# Patient Record
Sex: MALE | Race: WHITE | Employment: UNEMPLOYED | URBAN - METROPOLITAN AREA
[De-identification: names, ages, dates, MRNs, and addresses within clinical notes are randomized per-mention and may not be internally consistent; named-entity substitution may affect disease eponyms.]

---

## 2017-07-14 PROCEDURE — 99282 EMERGENCY DEPT VISIT SF MDM: CPT

## 2017-07-14 PROCEDURE — 75810000293 HC SIMP/SUPERF WND  RPR

## 2017-07-14 PROCEDURE — 77030018836 HC SOL IRR NACL ICUM -A

## 2017-07-14 PROCEDURE — 77030002986 HC SUT PROL J&J -A

## 2017-07-15 ENCOUNTER — HOSPITAL ENCOUNTER (EMERGENCY)
Age: 42
Discharge: HOME | End: 2017-07-15
Attending: EMERGENCY MEDICINE | Admitting: EMERGENCY MEDICINE
Payer: MEDICARE

## 2017-07-15 VITALS
HEIGHT: 64 IN | OXYGEN SATURATION: 96 % | RESPIRATION RATE: 18 BRPM | WEIGHT: 186.07 LBS | TEMPERATURE: 98.2 F | BODY MASS INDEX: 31.77 KG/M2 | SYSTOLIC BLOOD PRESSURE: 152 MMHG | DIASTOLIC BLOOD PRESSURE: 95 MMHG | HEART RATE: 82 BPM

## 2017-07-15 DIAGNOSIS — S81.812A LEG LACERATION, LEFT, INITIAL ENCOUNTER: Primary | ICD-10-CM

## 2017-07-15 RX ORDER — LIDOCAINE HYDROCHLORIDE AND EPINEPHRINE 20; 5 MG/ML; UG/ML
1.5 INJECTION, SOLUTION EPIDURAL; INFILTRATION; INTRACAUDAL; PERINEURAL ONCE
Status: DISCONTINUED | OUTPATIENT
Start: 2017-07-15 | End: 2017-07-15

## 2017-07-15 NOTE — DISCHARGE INSTRUCTIONS
Cuts Closed With Stitches: Care Instructions  Your Care Instructions  A cut can happen anywhere on your body. The doctor used stitches to close the cut. Using stitches also helps the cut heal and reduces scarring. Sometimes pieces of tape called Steri-Strips are put over the stitches. If the cut went deep and through the skin, the doctor may have put in two layers of stitches. The deeper layer brings the deep part of the cut together. These stitches will dissolve and don't need to be removed. The stitches in the upper layer are the ones you see on the cut. You will probably have a bandage over the stitches. You will need to have the stitches removed, usually in 7 to 14 days. The doctor has checked you carefully, but problems can develop later. If you notice any problems or new symptoms, get medical treatment right away. Follow-up care is a key part of your treatment and safety. Be sure to make and go to all appointments, and call your doctor if you are having problems. It's also a good idea to know your test results and keep a list of the medicines you take. How can you care for yourself at home? · Keep the cut dry for the first 24 to 48 hours. After this, you can shower if your doctor okays it. Pat the cut dry. · Don't soak the cut, such as in a bathtub. Your doctor will tell you when it's safe to get the cut wet. · If your doctor told you how to care for your cut, follow your doctor's instructions. If you did not get instructions, follow this general advice:  ¨ After the first 24 to 48 hours, wash around the cut with clean water 2 times a day. Don't use hydrogen peroxide or alcohol, which can slow healing. ¨ You may cover the cut with a thin layer of petroleum jelly, such as Vaseline, and a nonstick bandage. ¨ Apply more petroleum jelly and replace the bandage as needed. · Prop up the sore area on a pillow anytime you sit or lie down during the next 3 days.  Try to keep it above the level of your heart. This will help reduce swelling. · Avoid any activity that could cause your cut to reopen. · Do not remove the stitches on your own. Your doctor will tell you when to come back to have the stitches removed. · Leave Steri-Strips on until they fall off. · Be safe with medicines. Read and follow all instructions on the label. ¨ If the doctor gave you a prescription medicine for pain, take it as prescribed. ¨ If you are not taking a prescription pain medicine, ask your doctor if you can take an over-the-counter medicine. When should you call for help? Call your doctor now or seek immediate medical care if:  · You have new pain, or your pain gets worse. · The skin near the cut is cold or pale or changes color. · You have tingling, weakness, or numbness near the cut. · The cut starts to bleed, and blood soaks through the bandage. Oozing small amounts of blood is normal.  · You have trouble moving the area near the cut. · You have symptoms of infection, such as:  ¨ Increased pain, swelling, warmth, or redness around the cut. ¨ Red streaks leading from the cut. ¨ Pus draining from the cut. ¨ A fever. Watch closely for changes in your health, and be sure to contact your doctor if:  · The cut reopens. · You do not get better as expected. Where can you learn more? Go to http://john-floyd.info/. Enter R217 in the search box to learn more about \"Cuts Closed With Stitches: Care Instructions. \"  Current as of: March 20, 2017  Content Version: 11.3  © 2709-3710 [employer]. Care instructions adapted under license by [employer] (which disclaims liability or warranty for this information). If you have questions about a medical condition or this instruction, always ask your healthcare professional. [address] any warranty or liability for your use of this information.

## 2017-07-15 NOTE — ED NOTES
Discharge instructions reviewed with patient. Discharge instructions given to pt per Dr. Yue Cary. Patient able to return/verbalize discharge instructions. Copy of discharge instructions given. Pt condition stable, respiratory status within normal limits, neuro status intact. Ambulatory out of ER, accompanied by family.

## 2017-07-15 NOTE — ED PROVIDER NOTES
HPI Comments: Tomasa Wright is a 35 y.o. male, pmhx significant for esophageal CA, who presents ambulatory to the ED c/o laceration to the posterior aspect of the L calf PTA. Pt reports that a salsa jar fell out of the fridge as he opened it and broke, causing his laceration. Pt is not up to date on his tetanus shot but refused a booster at [address] [place] [address]. PCP: Jeovanny Garza MD      Social Hx: +tobacco (smokeless), +EtOH (occasionally), -Illicit Drugs  FHx: no pertinent family hx   Medication Allergies: levaquin      There are no other complaints, changes, or physical findings at this time. The history is provided by the patient. Past Medical History:   Diagnosis Date    Asthma     Cancer ([address]     esophageal cancer    GERD (gastroesophageal reflux disease)        Past Surgical History:   Procedure Laterality Date    HX HEENT  2010    cancer removed esphagus         History reviewed. No pertinent family history. Social History     Social History    Marital status:      Spouse name: N/A    Number of children: N/A    Years of education: N/A     Occupational History    Not on file. Social History Main Topics    Smoking status: Never Smoker    Smokeless tobacco: Current User    Alcohol use No      Comment: occ    Drug use: No    Sexual activity: Not on file     Other Topics Concern    Not on file     Social History Narrative         ALLERGIES: Bee sting [sting, bee]; Egg; Levaquin [levofloxacin]; and Milk    Review of Systems   Constitutional: Negative. Negative for appetite change, chills, fatigue and fever. HENT: Negative. Negative for congestion, rhinorrhea, sinus pressure and sore throat. Eyes: Negative. Respiratory: Negative. Negative for cough, choking, chest tightness, shortness of breath and wheezing. Cardiovascular: Negative. Negative for chest pain, palpitations and leg swelling.    Gastrointestinal: Negative for abdominal pain, constipation, diarrhea, nausea and vomiting. Endocrine: Negative. Genitourinary: Negative. Negative for difficulty urinating, dysuria, flank pain and urgency. Musculoskeletal: Negative. Skin: Positive for wound (laceration to L lateral calf). Neurological: Negative. Negative for dizziness, speech difficulty, weakness, light-headedness, numbness and headaches. Psychiatric/Behavioral: Negative. All other systems reviewed and are negative. Patient Vitals for the past 12 hrs:   Temp Pulse Resp BP SpO2   07/15/17 0317 - 82 18 (!) 152/95 96 %   07/14/17 2227 98.2 °F (36.8 °C) (!) 111 18 (!) 165/100 96 %       Physical Exam   Constitutional: He is oriented to person, place, and time. He appears well-developed and well-nourished. No distress. HENT:   Head: Normocephalic and atraumatic. Mouth/Throat: Oropharynx is clear and moist. No oropharyngeal exudate. Eyes: Conjunctivae and EOM are normal. Pupils are equal, round, and reactive to light. Neck: Normal range of motion. Neck supple. No JVD present. No tracheal deviation present. Cardiovascular: Normal rate, regular rhythm, normal heart sounds and intact distal pulses. No murmur heard. Pulmonary/Chest: Effort normal and breath sounds normal. No stridor. No respiratory distress. He has no wheezes. He has no rales. He exhibits no tenderness. Abdominal: Soft. Musculoskeletal: Normal range of motion. He exhibits no edema. Laceration to posterior left calf, no active bleeding   Neurological: He is alert and oriented to person, place, and time. No cranial nerve deficit. No gross motor or sensory deficits    Skin: Skin is warm and dry. He is not diaphoretic. Psychiatric: He has a normal mood and affect. His behavior is normal.   Nursing note and vitals reviewed.        MDM  Number of Diagnoses or Management Options  Diagnosis management comments: DDx: laceration        Amount and/or Complexity of Data Reviewed  Review and summarize past medical records: yes    Patient Progress  Patient progress: stable    ED Course       Procedures  Procedure Note - Laceration Repair:  3:15 AM  Procedure by Flora Ponce DO. Complexity: simple  3cm linear laceration to L lateral calf  was irrigated copiously with NS under jet lavage, prepped with shurclens and draped in a sterile fashion. The area was anesthetized with 4 mLs of  Lidocaine 1% with epinephrine via local infiltration. The wound was explored with the following results: No foreign bodies found. The wound was repaired with One layer suture closure: Skin Layer:  6 sutures placed, stitch type:simple interrupted, suture: 4-0 polypropylene. .  The wound was closed with good hemostasis and approximation. Sterile dressing applied. Estimated blood loss: 0ccs  The procedure took 1-15 minutes, and pt tolerated well. Written by London Callow, ED Scribe, as dictated by Flora Ponce DO.    DISCHARGE NOTE:  IMPRESSION:  No diagnosis found. PLAN:  Current Discharge Medication List        Follow-up Information     None        Return to ED if worse     DISCHARGE NOTE:  3:16 AM  Pt has been reexamined. Pt has no new complaints, changes, or physical findings. Care plan outlined and precautions discussed. All available results reviewed with pt. All medications reviewed with pt. All of pts questions and concerns addressed. Pt agrees to f/u as instructed and agrees to return to ED upon further deterioration. Pt is ready to go home. Written by London Callow ED Scribe as dictated by Flora Ponce DO    ATTESTATION   This note is prepared by London Callow acting as scribe for Flora Ponce, [address] [place] [address], DO : The scribe's documentation has been prepared under my direction and personally reviewed by me in its entirety. I confirm that the note above accurately reflects all work, treatment, procedures, and medical decision making performed by me.

## 2017-07-15 NOTE — ED NOTES
Pt complains of laceration to posterior portion of left calf after a salsa jar fell from the fridge and broke, and the glass cut his leg. Pt has been drinking beers tonight but acting appropriate at this time. Pt in no apparent distress at this time. Call bell within reach and plan of care discussed. Laceration repair supplies at bedside.

## 2020-05-27 ENCOUNTER — TELEPHONE (OUTPATIENT)
Dept: FAMILY MEDICINE CLINIC | Age: 45
End: 2020-05-27

## 2020-05-27 NOTE — TELEPHONE ENCOUNTER
Left Message for patient to return call to office. Called pt due to in office appointment scheduled. Pt to be rescheduled for virtual/telephone visit (NOT IF FOR COMPLETE PHYSICAL) or can reschedule for June/July due to [address] pandemic. New Patients must have access to a smartphone/computer with camera.

## 2021-04-05 ENCOUNTER — IMMUNIZATION (OUTPATIENT)
Dept: INTERNAL MEDICINE CLINIC | Age: 46
End: 2021-04-05
Payer: MEDICARE

## 2021-04-05 DIAGNOSIS — Z23 ENCOUNTER FOR IMMUNIZATION: Primary | ICD-10-CM

## 2021-04-05 PROCEDURE — 91300 COVID-19, MRNA, LNP-S, PF, 30MCG/0.3ML DOSE(PFIZER): CPT | Performed by: FAMILY MEDICINE

## 2021-04-05 PROCEDURE — 0001A COVID-19, MRNA, LNP-S, PF, 30MCG/0.3ML DOSE(PFIZER): CPT | Performed by: FAMILY MEDICINE

## 2021-04-26 ENCOUNTER — IMMUNIZATION (OUTPATIENT)
Dept: INTERNAL MEDICINE CLINIC | Age: 46
End: 2021-04-26
Payer: MEDICARE

## 2021-04-26 DIAGNOSIS — Z23 ENCOUNTER FOR IMMUNIZATION: Primary | ICD-10-CM

## 2021-04-26 PROCEDURE — 0002A COVID-19, MRNA, LNP-S, PF, 30MCG/0.3ML DOSE(PFIZER): CPT | Performed by: FAMILY MEDICINE

## 2021-04-26 PROCEDURE — 91300 COVID-19, MRNA, LNP-S, PF, 30MCG/0.3ML DOSE(PFIZER): CPT | Performed by: FAMILY MEDICINE

## 2022-02-17 ENCOUNTER — OFFICE VISIT (OUTPATIENT)
Dept: FAMILY MEDICINE CLINIC | Age: 47
End: 2022-02-17
Payer: MEDICARE

## 2022-02-17 VITALS
HEIGHT: 64 IN | HEART RATE: 74 BPM | BODY MASS INDEX: 29.81 KG/M2 | WEIGHT: 174.6 LBS | SYSTOLIC BLOOD PRESSURE: 165 MMHG | RESPIRATION RATE: 16 BRPM | DIASTOLIC BLOOD PRESSURE: 92 MMHG | OXYGEN SATURATION: 94 % | TEMPERATURE: 98 F

## 2022-02-17 DIAGNOSIS — M53.3 COCCYGEAL PAIN: ICD-10-CM

## 2022-02-17 DIAGNOSIS — M54.42 CHRONIC BILATERAL LOW BACK PAIN WITH BILATERAL SCIATICA: ICD-10-CM

## 2022-02-17 DIAGNOSIS — I10 ESSENTIAL HYPERTENSION: ICD-10-CM

## 2022-02-17 DIAGNOSIS — M54.41 CHRONIC BILATERAL LOW BACK PAIN WITH BILATERAL SCIATICA: ICD-10-CM

## 2022-02-17 DIAGNOSIS — Z23 ENCOUNTER FOR ADMINISTRATION OF COVID-19 VACCINE: Primary | ICD-10-CM

## 2022-02-17 DIAGNOSIS — G89.29 CHRONIC BILATERAL LOW BACK PAIN WITH BILATERAL SCIATICA: ICD-10-CM

## 2022-02-17 PROCEDURE — G8753 SYS BP > OR = 140: HCPCS | Performed by: FAMILY MEDICINE

## 2022-02-17 PROCEDURE — 90000 COVID-19, PFIZER GRAY TOP, DO NOT DILUTE, TRIS-SUCROSE, 12+ YRS, PF, 30MCG/0.3 ML DOSE: CPT | Performed by: FAMILY MEDICINE

## 2022-02-17 PROCEDURE — G8755 DIAS BP > OR = 90: HCPCS | Performed by: FAMILY MEDICINE

## 2022-02-17 PROCEDURE — 99213 OFFICE O/P EST LOW 20 MIN: CPT | Performed by: FAMILY MEDICINE

## 2022-02-17 PROCEDURE — G8427 DOCREV CUR MEDS BY ELIG CLIN: HCPCS | Performed by: FAMILY MEDICINE

## 2022-02-17 PROCEDURE — G8419 CALC BMI OUT NRM PARAM NOF/U: HCPCS | Performed by: FAMILY MEDICINE

## 2022-02-17 PROCEDURE — 91305 COVID-19, PFIZER GRAY TOP, DO NOT DILUTE, TRIS-SUCROSE, 12+ YRS, PF, 30MCG/0.3 ML DOSE: CPT | Performed by: FAMILY MEDICINE

## 2022-02-17 PROCEDURE — G8510 SCR DEP NEG, NO PLAN REQD: HCPCS | Performed by: FAMILY MEDICINE

## 2022-02-17 PROCEDURE — G0463 HOSPITAL OUTPT CLINIC VISIT: HCPCS | Performed by: FAMILY MEDICINE

## 2022-02-17 RX ORDER — METHOCARBAMOL 750 MG/1
750 TABLET, FILM COATED ORAL 3 TIMES DAILY
Qty: 90 TABLET | Refills: 2 | Status: SHIPPED | OUTPATIENT
Start: 2022-02-17

## 2022-02-17 RX ORDER — CELECOXIB 200 MG/1
200 CAPSULE ORAL 2 TIMES DAILY
Qty: 60 CAPSULE | Refills: 0 | Status: SHIPPED | OUTPATIENT
Start: 2022-02-17 | End: 2022-03-19

## 2022-02-17 NOTE — PROGRESS NOTES
HISTORY OF PRESENT ILLNESS  Dayana Barton is a 48 y.o. male. HPI slipped on ice 13 years ago, broke coccyx bone - has been hurting ever since. Now has pain going down both legs. Goes to see a doctor in sports medicine at [facility]. Has trouble sitting on this bone. Not taking any pain meds. Hx esophageal cancer for 13 years, was operated on 13 years ago. Surgery was at [facility]- goes for an esophageal dilation every 6 months. Otherwise doing fairly well. Only medicine that takes is nexium. Pain is in lower back , radiates to both legs. hasnt had an MRI of back. ROS    Physical Exam  Vitals and nursing note reviewed. Constitutional:       Appearance: He is well-developed. HENT:      Right Ear: External ear normal.      Left Ear: External ear normal.   Neck:      Thyroid: No thyromegaly. Cardiovascular:      Rate and Rhythm: Normal rate and regular rhythm. Heart sounds: Normal heart sounds. Pulmonary:      Effort: Pulmonary effort is normal. No respiratory distress. Breath sounds: Normal breath sounds. No wheezing. Abdominal:      General: Bowel sounds are normal. There is no distension. Palpations: Abdomen is soft. There is no mass. Tenderness: There is no abdominal tenderness. There is no guarding. Musculoskeletal:         General: Normal range of motion. Comments: Tenderness lower coccygeal area. SLR positive on L. DTRS, EHL = bilaterally. Lymphadenopathy:      Cervical: No cervical adenopathy. ASSESSMENT and PLAN  Orders Placed This Encounter    XR SPINE LUMB 2 OR 3 V    XR SACRUM AND COCCYX    COVID-19, Pfizer The adhoclabs, DO NOT Dilute, Magdy-Sucrose, 12+ yrs, PF, 30mcg/0.3 mL dose    CBC WITH AUTOMATED DIFF    METABOLIC PANEL, COMPREHENSIVE    [facility]    celecoxib (CELEBREX) 200 mg capsule    methocarbamoL (ROBAXIN) 750 mg tablet     Diagnoses and all orders for this visit:    1.  Encounter for administration of COVID-19 vaccine  - COVID-19, PFIZER GRAY TOP, DO NOT DILUTE, PILAR-SUCROSE, 12+ YRS, PF, 30MCG/0.3 ML DOSE    2. Chronic bilateral low back pain with bilateral sciatica  -     XR SPINE LUMB 2 OR 3 V; Future  -     REFERRAL TO ORTHOPEDICS  -     celecoxib (CELEBREX) 200 mg capsule; Take 1 Capsule by mouth two (2) times a day for 30 days. For back pain    3. Coccygeal pain  -     XR SACRUM AND COCCYX; Future  -     REFERRAL TO ORTHOPEDICS    4. Essential hypertension  -     CBC WITH AUTOMATED DIFF; Future  -     METABOLIC PANEL, COMPREHENSIVE; Future    Other orders  -     methocarbamoL (ROBAXIN) 750 mg tablet; Take 1 Tablet by mouth three (3) times daily. Follow-up and Dispositions    · Return in about 6 weeks (around 3/31/2022).

## 2022-02-17 NOTE — PROGRESS NOTES
Chief Complaint   Patient presents with   [facility] Annual Wellness Visit     1. Have you been to the ER, urgent care clinic since your last visit? Hospitalized since your last visit? No    2. Have you seen or consulted any other health care providers outside of the [address] [place] [address] since your last visit? Include any pap smears or colon screening. Yes Reason for visit: [facility] - specialists  GI and Oncolodgy     [facility] GI - Dr. Christy Santos- phone [phone]. Oncology - Dr. Ester Rai - phone [phone]    Verbal Order received from Dr. Nixon Palmer  for Butler Peter booster  given IM  in left arm.

## 2022-03-31 ENCOUNTER — TRANSCRIBE ORDER (OUTPATIENT)
Dept: SCHEDULING | Age: 47
End: 2022-03-31

## 2022-03-31 DIAGNOSIS — M54.32 BILATERAL SCIATICA: ICD-10-CM

## 2022-03-31 DIAGNOSIS — M54.31 BILATERAL SCIATICA: ICD-10-CM

## 2022-03-31 DIAGNOSIS — M54.50 LOW BACK PAIN, UNSPECIFIED BACK PAIN LATERALITY, UNSPECIFIED CHRONICITY, UNSPECIFIED WHETHER SCIATICA PRESENT: Primary | ICD-10-CM

## 2022-03-31 DIAGNOSIS — M47.26 OSTEOARTHRITIS OF SPINE WITH RADICULOPATHY, LUMBAR REGION: ICD-10-CM

## 2022-03-31 DIAGNOSIS — R29.898 WEAKNESS OF LEFT LOWER EXTREMITY: ICD-10-CM

## 2022-04-08 ENCOUNTER — HOSPITAL ENCOUNTER (OUTPATIENT)
Dept: MRI IMAGING | Age: 47
Discharge: HOME | End: 2022-04-08
Attending: ORTHOPAEDIC SURGERY
Payer: MEDICARE

## 2022-04-08 DIAGNOSIS — M54.31 BILATERAL SCIATICA: ICD-10-CM

## 2022-04-08 DIAGNOSIS — R29.898 WEAKNESS OF LEFT LOWER EXTREMITY: ICD-10-CM

## 2022-04-08 DIAGNOSIS — M54.50 LOW BACK PAIN, UNSPECIFIED BACK PAIN LATERALITY, UNSPECIFIED CHRONICITY, UNSPECIFIED WHETHER SCIATICA PRESENT: ICD-10-CM

## 2022-04-08 DIAGNOSIS — M47.26 OSTEOARTHRITIS OF SPINE WITH RADICULOPATHY, LUMBAR REGION: ICD-10-CM

## 2022-04-08 DIAGNOSIS — M54.32 BILATERAL SCIATICA: ICD-10-CM

## 2022-04-08 PROCEDURE — 72148 MRI LUMBAR SPINE W/O DYE: CPT

## 2022-04-25 ENCOUNTER — OFFICE VISIT (OUTPATIENT)
Dept: FAMILY MEDICINE CLINIC | Age: 47
End: 2022-04-25
Payer: MEDICARE

## 2022-04-25 VITALS
HEART RATE: 110 BPM | HEIGHT: 64 IN | TEMPERATURE: 98 F | SYSTOLIC BLOOD PRESSURE: 170 MMHG | RESPIRATION RATE: 16 BRPM | BODY MASS INDEX: 31.14 KG/M2 | DIASTOLIC BLOOD PRESSURE: 98 MMHG | WEIGHT: 182.4 LBS | OXYGEN SATURATION: 95 %

## 2022-04-25 DIAGNOSIS — M54.16 LUMBAR RADICULOPATHY: ICD-10-CM

## 2022-04-25 DIAGNOSIS — I10 ESSENTIAL HYPERTENSION: Primary | ICD-10-CM

## 2022-04-25 LAB
ALBUMIN SERPL-MCNC: 4.2 G/DL (ref 3.5–5)
ALBUMIN/GLOB SERPL: 1.2 {RATIO} (ref 1.1–2.2)
ALP SERPL-CCNC: 108 U/L (ref 45–117)
ALT SERPL-CCNC: 27 U/L (ref 12–78)
ANION GAP SERPL CALC-SCNC: 13 MMOL/L (ref 5–15)
AST SERPL-CCNC: 20 U/L (ref 15–37)
BASOPHILS # BLD: 0.1 K/UL (ref 0–0.1)
BASOPHILS NFR BLD: 1 % (ref 0–1)
BILIRUB SERPL-MCNC: 0.4 MG/DL (ref 0.2–1)
BUN SERPL-MCNC: 13 MG/DL (ref 6–20)
BUN/CREAT SERPL: 15 (ref 12–20)
CALCIUM SERPL-MCNC: 8.4 MG/DL (ref 8.5–10.1)
CHLORIDE SERPL-SCNC: 104 MMOL/L (ref 97–108)
CHOLEST SERPL-MCNC: 202 MG/DL
CO2 SERPL-SCNC: 22 MMOL/L (ref 21–32)
CREAT SERPL-MCNC: 0.88 MG/DL (ref 0.7–1.3)
DIFFERENTIAL METHOD BLD: ABNORMAL
EOSINOPHIL # BLD: 0.6 K/UL (ref 0–0.4)
EOSINOPHIL NFR BLD: 7 % (ref 0–7)
ERYTHROCYTE [DISTWIDTH] IN BLOOD BY AUTOMATED COUNT: 14 % (ref 11.5–14.5)
GLOBULIN SER CALC-MCNC: 3.6 G/DL (ref 2–4)
GLUCOSE SERPL-MCNC: 90 MG/DL (ref 65–100)
HCT VFR BLD AUTO: 41.2 % (ref 36.6–50.3)
HDLC SERPL-MCNC: 40 MG/DL
HDLC SERPL: 5.1 {RATIO} (ref 0–5)
HGB BLD-MCNC: 13.2 G/DL (ref 12.1–17)
IMM GRANULOCYTES # BLD AUTO: 0.1 K/UL (ref 0–0.04)
IMM GRANULOCYTES NFR BLD AUTO: 1 % (ref 0–0.5)
LDLC SERPL CALC-MCNC: 94.6 MG/DL (ref 0–100)
LYMPHOCYTES # BLD: 1.9 K/UL (ref 0.8–3.5)
LYMPHOCYTES NFR BLD: 23 % (ref 12–49)
MCH RBC QN AUTO: 28.1 PG (ref 26–34)
MCHC RBC AUTO-ENTMCNC: 32 G/DL (ref 30–36.5)
MCV RBC AUTO: 87.8 FL (ref 80–99)
MONOCYTES # BLD: 0.7 K/UL (ref 0–1)
MONOCYTES NFR BLD: 8 % (ref 5–13)
NEUTS SEG # BLD: 5.2 K/UL (ref 1.8–8)
NEUTS SEG NFR BLD: 60 % (ref 32–75)
NRBC # BLD: 0 K/UL (ref 0–0.01)
NRBC BLD-RTO: 0 PER 100 WBC
PLATELET # BLD AUTO: 244 K/UL (ref 150–400)
PMV BLD AUTO: 10 FL (ref 8.9–12.9)
POTASSIUM SERPL-SCNC: 3.6 MMOL/L (ref 3.5–5.1)
PROT SERPL-MCNC: 7.8 G/DL (ref 6.4–8.2)
RBC # BLD AUTO: 4.69 M/UL (ref 4.1–5.7)
SODIUM SERPL-SCNC: 139 MMOL/L (ref 136–145)
TRIGL SERPL-MCNC: 337 MG/DL (ref ?–150)
VLDLC SERPL CALC-MCNC: 67.4 MG/DL
WBC # BLD AUTO: 8.6 K/UL (ref 4.1–11.1)

## 2022-04-25 PROCEDURE — G8753 SYS BP > OR = 140: HCPCS | Performed by: FAMILY MEDICINE

## 2022-04-25 PROCEDURE — G8510 SCR DEP NEG, NO PLAN REQD: HCPCS | Performed by: FAMILY MEDICINE

## 2022-04-25 PROCEDURE — G8417 CALC BMI ABV UP PARAM F/U: HCPCS | Performed by: FAMILY MEDICINE

## 2022-04-25 PROCEDURE — G0463 HOSPITAL OUTPT CLINIC VISIT: HCPCS | Performed by: FAMILY MEDICINE

## 2022-04-25 PROCEDURE — G8427 DOCREV CUR MEDS BY ELIG CLIN: HCPCS | Performed by: FAMILY MEDICINE

## 2022-04-25 PROCEDURE — G8755 DIAS BP > OR = 90: HCPCS | Performed by: FAMILY MEDICINE

## 2022-04-25 PROCEDURE — 99213 OFFICE O/P EST LOW 20 MIN: CPT | Performed by: FAMILY MEDICINE

## 2022-04-25 RX ORDER — EPINEPHRINE 0.3 MG/.3ML
0.3 INJECTION SUBCUTANEOUS
Qty: 2 EACH | Refills: 3 | Status: SHIPPED | OUTPATIENT
Start: 2022-04-25 | End: 2022-04-25

## 2022-04-25 RX ORDER — ALBUTEROL SULFATE 90 UG/1
2 AEROSOL, METERED RESPIRATORY (INHALATION)
Qty: 18 G | Refills: 5 | Status: SHIPPED | OUTPATIENT
Start: 2022-04-25

## 2022-04-25 RX ORDER — CELECOXIB 200 MG/1
200 CAPSULE ORAL DAILY
COMMUNITY
Start: 2022-02-17

## 2022-04-25 RX ORDER — LISINOPRIL AND HYDROCHLOROTHIAZIDE 12.5; 2 MG/1; MG/1
1 TABLET ORAL DAILY
Qty: 90 TABLET | Refills: 3 | Status: SHIPPED | OUTPATIENT
Start: 2022-04-25

## 2022-04-25 RX ORDER — PREGABALIN 75 MG/1
75 CAPSULE ORAL 2 TIMES DAILY
COMMUNITY
Start: 2022-03-30

## 2022-04-25 NOTE — PROGRESS NOTES
Chief Complaint   Patient presents with   [facility] Wellness Visit       1. \"Have you been to the ER, urgent care clinic since your last visit? Hospitalized since your last visit? \" No    2. \"Have you seen or consulted any other health care providers outside of the [address] [place] [address] since your last visit? \" Yes Oncology, Ortho and GI     3. For patients aged 39-70: Has the patient had a colonoscopy / FIT/ Cologuard? No      If the patient is female:    4. For patients aged 41-77: Has the patient had a mammogram within the past 2 years? NA - based on age or sex      11. For patients aged 21-65: Has the patient had a pap smear?  NA - based on age or sex    Health Maintenance Due   Topic Date Due    Hepatitis C Screening  Never done    DTaP/Tdap/Td series (1 - Tdap) Never done    Lipid Screen  Never done    Colorectal Cancer Screening Combo  Never done    Medicare Yearly Exam  Never done

## 2022-04-25 NOTE — PROGRESS NOTES
HISTORY OF PRESENT ILLNESS  Rhiannon Rinaldi is a 48 y.o. male. HPI Comes in today with wife. Has lower back pain that radiates into both legs. Pain is worse with sitting. Also can be painful if out walking. No bowel or bladder problems. Some burning in hips if walks a lot. ROS    Physical Exam  Vitals and nursing note reviewed. Constitutional:       Appearance: He is well-developed. HENT:      Right Ear: External ear normal.      Left Ear: External ear normal.   Neck:      Thyroid: No thyromegaly. Cardiovascular:      Rate and Rhythm: Normal rate and regular rhythm. Pulses: Normal pulses. Heart sounds: Normal heart sounds. Pulmonary:      Effort: Pulmonary effort is normal. No respiratory distress. Breath sounds: Normal breath sounds. No wheezing. Abdominal:      General: Bowel sounds are normal. There is no distension. Palpations: Abdomen is soft. There is no mass. Tenderness: There is no abdominal tenderness. There is no guarding. Musculoskeletal:         General: Normal range of motion. Comments: SLR negative bilaterally   Lymphadenopathy:      Cervical: No cervical adenopathy. ASSESSMENT and PLAN  Orders Placed This Encounter    METABOLIC PANEL, COMPREHENSIVE    CBC WITH AUTOMATED DIFF    LIPID PANEL   [facility]    lisinopril-hydroCHLOROthiazide (PRINZIDE, ZESTORETIC) 20-12.5 mg per tablet    albuterol (PROVENTIL HFA, VENTOLIN HFA, PROAIR HFA) 90 mcg/actuation inhaler    EPINEPHrine (EPIPEN) 0.3 mg/0.3 mL injection     Diagnoses and all orders for this visit:    1. Essential hypertension  -     METABOLIC PANEL, COMPREHENSIVE; Future  -     CBC WITH AUTOMATED DIFF; Future  -     LIPID PANEL; Future    2. Lumbar radiculopathy  -     REFERRAL TO NEUROLOGY    Other orders  -     lisinopril-hydroCHLOROthiazide (PRINZIDE, ZESTORETIC) 20-12.5 mg per tablet; Take 1 Tablet by mouth daily.  For blood pressure  -     albuterol (PROVENTIL HFA, VENTOLIN HFA, PROAIR HFA) 90 mcg/actuation inhaler; Take 2 Puffs by inhalation every four (4) hours as needed for Wheezing.  -     EPINEPHrine (EPIPEN) 0.3 mg/0.3 mL injection; 0.3 mL by IntraMUSCular route once as needed for Allergic Response for up to 1 dose. Follow-up and Dispositions    · Return in about 3 months (around 7/25/2022).

## 2022-09-22 ENCOUNTER — TELEPHONE (OUTPATIENT)
Dept: FAMILY MEDICINE CLINIC | Age: 47
End: 2022-09-22

## 2022-09-22 NOTE — TELEPHONE ENCOUNTER
Patients wife, Jodi Baca called in regards to her husbands richie ham. She requested a call back and can be reached at [phone].

## 2022-09-22 NOTE — TELEPHONE ENCOUNTER
Unable to reach patient for return call. Neurology appt in chart. Appt 10/25/22 with Dr. Good Mis - [phone]  phone [phone].

## 2022-09-23 NOTE — TELEPHONE ENCOUNTER
Called number on file. Gentleman who answered said there is no there by either Catarina Castle or Wilder Corey.

## 2023-05-10 RX ORDER — LISINOPRIL AND HYDROCHLOROTHIAZIDE 20; 12.5 MG/1; MG/1
TABLET ORAL
Qty: 90 TABLET | Refills: 3 | OUTPATIENT
Start: 2023-05-10

## 2023-05-12 RX ORDER — LISINOPRIL AND HYDROCHLOROTHIAZIDE 20; 12.5 MG/1; MG/1
TABLET ORAL
COMMUNITY
Start: 2023-02-17 | End: 2023-06-12 | Stop reason: SDUPTHER

## 2023-05-12 RX ORDER — LISINOPRIL AND HYDROCHLOROTHIAZIDE 20; 12.5 MG/1; MG/1
TABLET ORAL
Qty: 30 TABLET | Refills: 5 | Status: SHIPPED | OUTPATIENT
Start: 2023-05-12

## 2023-05-18 RX ORDER — PREGABALIN 75 MG/1
75 CAPSULE ORAL 2 TIMES DAILY
COMMUNITY
Start: 2022-03-30 | End: 2023-06-12

## 2023-05-18 RX ORDER — ALBUTEROL SULFATE 90 UG/1
2 AEROSOL, METERED RESPIRATORY (INHALATION) EVERY 4 HOURS PRN
COMMUNITY
Start: 2022-04-25

## 2023-05-18 RX ORDER — CELECOXIB 200 MG/1
200 CAPSULE ORAL DAILY
COMMUNITY
Start: 2022-02-17 | End: 2023-06-12 | Stop reason: ALTCHOICE

## 2023-05-18 RX ORDER — METHOCARBAMOL 750 MG/1
750 TABLET, FILM COATED ORAL 3 TIMES DAILY
COMMUNITY
Start: 2022-02-17 | End: 2023-06-12

## 2023-11-15 RX ORDER — LISINOPRIL AND HYDROCHLOROTHIAZIDE 20; 12.5 MG/1; MG/1
TABLET ORAL
Qty: 90 TABLET | Refills: 1 | Status: SHIPPED | OUTPATIENT
Start: 2023-11-15

## 2023-11-15 NOTE — TELEPHONE ENCOUNTER
Last appointment: 6/12/23  Next appointment: 12/12/23  Previous refill encounter(s): 5/12/23 30 d/s with 5 refills    Requested Prescriptions     Pending Prescriptions Disp Refills    lisinopril-hydroCHLOROthiazide (PRINZIDE;ZESTORETIC) 20-12.5 MG per tablet [Pharmacy Med Name: LISINOPRIL-HCTZ 20-12.5 MG TAB[*]] 90 tablet 3     Sig: TAKE ONE TABLET BY MOUTH ONE TIME DAILY FOR BLOOD PRESSURE         For Pharmacy Admin Tracking Only    Program: Medication Refill  CPA in place:    Recommendation Provided To:   Intervention Detail: New Rx: 1, reason: Patient Preference  Intervention Accepted By:   Gap Closed?:    Time Spent (min): 5

## 2023-12-12 ENCOUNTER — OFFICE VISIT (OUTPATIENT)
Age: 48
End: 2023-12-12
Payer: MEDICARE

## 2023-12-12 VITALS
DIASTOLIC BLOOD PRESSURE: 88 MMHG | BODY MASS INDEX: 29.99 KG/M2 | WEIGHT: 186.6 LBS | RESPIRATION RATE: 18 BRPM | OXYGEN SATURATION: 96 % | SYSTOLIC BLOOD PRESSURE: 135 MMHG | HEIGHT: 66 IN | HEART RATE: 87 BPM | TEMPERATURE: 97.7 F

## 2023-12-12 DIAGNOSIS — C15.3 MALIGNANT NEOPLASM OF UPPER THIRD OF ESOPHAGUS (HCC): ICD-10-CM

## 2023-12-12 DIAGNOSIS — Z90.49 H/O ESOPHAGECTOMY: ICD-10-CM

## 2023-12-12 DIAGNOSIS — M53.3 COCCYDYNIA: ICD-10-CM

## 2023-12-12 DIAGNOSIS — M54.42 CHRONIC LOW BACK PAIN WITH LEFT-SIDED SCIATICA, UNSPECIFIED BACK PAIN LATERALITY: ICD-10-CM

## 2023-12-12 DIAGNOSIS — G89.29 CHRONIC LOW BACK PAIN WITH LEFT-SIDED SCIATICA, UNSPECIFIED BACK PAIN LATERALITY: ICD-10-CM

## 2023-12-12 DIAGNOSIS — Z98.890 H/O ESOPHAGECTOMY: ICD-10-CM

## 2023-12-12 DIAGNOSIS — I10 ESSENTIAL (PRIMARY) HYPERTENSION: Primary | ICD-10-CM

## 2023-12-12 DIAGNOSIS — M54.16 RADICULOPATHY, LUMBAR REGION: ICD-10-CM

## 2023-12-12 DIAGNOSIS — Z12.11 SCREEN FOR COLON CANCER: ICD-10-CM

## 2023-12-12 PROCEDURE — 99213 OFFICE O/P EST LOW 20 MIN: CPT | Performed by: FAMILY MEDICINE

## 2023-12-12 PROCEDURE — 3079F DIAST BP 80-89 MM HG: CPT | Performed by: FAMILY MEDICINE

## 2023-12-12 PROCEDURE — G8484 FLU IMMUNIZE NO ADMIN: HCPCS | Performed by: FAMILY MEDICINE

## 2023-12-12 PROCEDURE — G8417 CALC BMI ABV UP PARAM F/U: HCPCS | Performed by: FAMILY MEDICINE

## 2023-12-12 PROCEDURE — 4004F PT TOBACCO SCREEN RCVD TLK: CPT | Performed by: FAMILY MEDICINE

## 2023-12-12 PROCEDURE — 3017F COLORECTAL CA SCREEN DOC REV: CPT | Performed by: FAMILY MEDICINE

## 2023-12-12 PROCEDURE — G8427 DOCREV CUR MEDS BY ELIG CLIN: HCPCS | Performed by: FAMILY MEDICINE

## 2023-12-12 PROCEDURE — 3075F SYST BP GE 130 - 139MM HG: CPT | Performed by: FAMILY MEDICINE

## 2023-12-12 RX ORDER — GABAPENTIN 300 MG/1
CAPSULE ORAL
COMMUNITY
Start: 2023-10-13

## 2023-12-12 ASSESSMENT — ENCOUNTER SYMPTOMS
CHEST TIGHTNESS: 0
ABDOMINAL PAIN: 0
COUGH: 0
ANAL BLEEDING: 0
ABDOMINAL DISTENTION: 0
SHORTNESS OF BREATH: 0
BACK PAIN: 1

## 2023-12-12 ASSESSMENT — PATIENT HEALTH QUESTIONNAIRE - PHQ9
SUM OF ALL RESPONSES TO PHQ QUESTIONS 1-9: 0
SUM OF ALL RESPONSES TO PHQ QUESTIONS 1-9: 0
SUM OF ALL RESPONSES TO PHQ9 QUESTIONS 1 & 2: 0
1. LITTLE INTEREST OR PLEASURE IN DOING THINGS: 0
2. FEELING DOWN, DEPRESSED OR HOPELESS: 0
SUM OF ALL RESPONSES TO PHQ QUESTIONS 1-9: 0
SUM OF ALL RESPONSES TO PHQ QUESTIONS 1-9: 0

## 2023-12-12 NOTE — PROGRESS NOTES
Subjective:      Patient ID: Mayela Pardo is a 46 y.o. male. f/u hbp,esophageal ca s/p resection,lumbago with sciatica,chronic severe coccydynia    HPI        Coocygeal Pain,chronic,worsening,unable to sit for more than a few minutes      Back Pain  This is a chronic problem. The current episode started more than 1 year ago. The problem occurs daily. The problem is unchanged. Associated symptoms include chest pain. Gastroesophageal Reflux  He complains of belching, chest pain, choking and dysphagia. This is a recurrent problem. The problem has been unchanged. The symptoms are aggravated by certain foods. Pertinent negatives include no fatigue    Review of Systems   HENT:  Negative for congestion. Respiratory:  Negative for cough, chest tightness and shortness of breath. Cardiovascular:  Negative for chest pain and palpitations. Gastrointestinal:  Negative for abdominal distention, abdominal pain and anal bleeding. Musculoskeletal:  Positive for back pain. Psychiatric/Behavioral:  Negative for agitation. Objective:   Physical Exam  Constitutional:       Appearance: Normal appearance. HENT:      Head: Normocephalic and atraumatic. Cardiovascular:      Rate and Rhythm: Normal rate. Pulses: Normal pulses. Heart sounds: Normal heart sounds. Pulmonary:      Effort: Pulmonary effort is normal.   Abdominal:      Palpations: Abdomen is soft. Musculoskeletal:      Lumbar back: Tenderness present. Decreased range of motion. Negative right straight leg raise test and negative left straight leg raise test.        Back:    Neurological:      Mental Status: He is alert. Jessica Reynoso was seen today for follow-up.     Diagnoses and all orders for this visit:    Essential (primary) hypertension    Coccydynia,[facility] declined to operate  -     Gaston James MD, Orthopedic Surgery (back, neck, spine), [place]    Chronic low back pain with left-sided sciatica, unspecified back pain

## 2023-12-12 NOTE — PROGRESS NOTES
Chief Complaint   Patient presents with    Follow-up     Patient is here today for a 6 month follow up. 1. Have you been to the ER, urgent care clinic since your last visit? Hospitalized since your last visit? No    2. Have you seen or consulted any other health care providers outside of the [address] [place] [address] since your last visit? Include any pap smears or colon screening.  [facility]

## 2023-12-13 ENCOUNTER — TELEPHONE (OUTPATIENT)
Age: 48
End: 2023-12-13

## 2023-12-13 DIAGNOSIS — J45.20 MILD INTERMITTENT ASTHMA WITHOUT COMPLICATION: ICD-10-CM

## 2023-12-13 DIAGNOSIS — M10.9 ACUTE GOUT, UNSPECIFIED CAUSE, UNSPECIFIED SITE: Primary | ICD-10-CM

## 2023-12-13 RX ORDER — CETIRIZINE HYDROCHLORIDE 10 MG/1
10 TABLET ORAL DAILY
Qty: 30 TABLET | Refills: 5 | Status: SHIPPED | OUTPATIENT
Start: 2023-12-13

## 2023-12-13 RX ORDER — ALBUTEROL SULFATE 90 UG/1
AEROSOL, METERED RESPIRATORY (INHALATION)
Qty: 18 G | Refills: 2 | Status: CANCELLED | OUTPATIENT
Start: 2023-12-13

## 2023-12-13 RX ORDER — ALBUTEROL SULFATE 90 UG/1
2 AEROSOL, METERED RESPIRATORY (INHALATION) EVERY 4 HOURS PRN
Qty: 18 G | Refills: 5 | Status: SHIPPED | OUTPATIENT
Start: 2023-12-13

## 2023-12-13 RX ORDER — PREDNISONE 20 MG/1
20 TABLET ORAL 2 TIMES DAILY
Qty: 10 TABLET | Refills: 0 | Status: SHIPPED | OUTPATIENT
Start: 2023-12-13 | End: 2023-12-18

## 2023-12-13 NOTE — TELEPHONE ENCOUNTER
----- Message from [address] sent at 12/13/2023 12:41 PM EST -----  Subject: Medication Problem    Medication: albuterol sulfate HFA (PROVENTIL;VENTOLIN;PROAIR) 108 (90   Base) MCG/ACT inhaler  Dosage: A/N  Ordering Provider: jeanie    Question/Problem: Maryann Boucher, Patient's wife would like to speak to the nurse   about her  medication. They went to the pharmacy and wasn't able to   have the medication. She said there is on for a gout, inhaler and   albuterol.       Pharmacy: [facility], [address] [phone] [facility] [phone]    ---------------------------------------------------------------------------  --------------  Sera Allenford ALISSON  [phone]; OK to leave message on voicemail  ---------------------------------------------------------------------------  --------------    SCRIPT ANSWERS  Relationship to Patient: Spouse/Partner  Representative Name: Maryann Boucher  Is the representative on the Communication Release of Information (FRANCK)   form in Epic: Yes

## 2023-12-13 NOTE — TELEPHONE ENCOUNTER
Last appointment: 12/12/23  Next appointment: 5/3/24  Previous refill encounter(s): 6/12/23 #30 with 5 refills    Requested Prescriptions     Pending Prescriptions Disp Refills    cetirizine (ZYRTEC) 10 MG tablet [Pharmacy Med Name: CETIRIZINE 10 MG TAB[*]] 30 tablet 5     Sig: TAKE ONE TABLET BY MOUTH ONE TIME DAILY         For Pharmacy Admin Tracking Only    Program: Medication Refill  CPA in place:    Recommendation Provided To:    Intervention Detail: New Rx: 1, reason: Patient Preference  Intervention Accepted By:   Pam Mckeon Closed?:    Time Spent (min): 5

## 2023-12-13 NOTE — TELEPHONE ENCOUNTER
Pt wife Kurt Mccray called and stated the pt gout medication or the inhaler was not sent to the pharmacy. She wants a call back at [phone].   The albuterol inhaler was sent to  for approval.

## 2024-02-02 ENCOUNTER — TELEPHONE (OUTPATIENT)
Age: 49
End: 2024-02-02

## 2024-02-02 DIAGNOSIS — M10.9 ACUTE GOUT OF KNEE, UNSPECIFIED CAUSE, UNSPECIFIED LATERALITY: Primary | ICD-10-CM

## 2024-02-02 RX ORDER — PREDNISONE 20 MG/1
20 TABLET ORAL 2 TIMES DAILY
Qty: 10 TABLET | Refills: 0 | Status: SHIPPED | OUTPATIENT
Start: 2024-02-02 | End: 2024-02-07

## 2024-02-02 NOTE — TELEPHONE ENCOUNTER
----- Message from Yoon Pemberton sent at 2/2/2024  9:41 AM EST -----  Subject: Message to Provider    QUESTIONS  Information for Provider? Lorena, wife of pt is having a flare up with his   Gout in his knee. Wife of pt is requesting the same medication that was   prescribed for this issue during his last flare up. Per wife the   medication helped improve the issue. Pharmacy is [facility] in [place], the   one across the street from your office.   ---------------------------------------------------------------------------  --------------  CALL BACK INFO  [phone]; OK to leave message on voicemail  ---------------------------------------------------------------------------  --------------  SCRIPT ANSWERS  Relationship to Patient? Spouse/Partner  Representative Name? Lorena  Is the representative on the Communication Release of Information (FRANCK)   form in Epic? Yes

## 2024-02-02 NOTE — TELEPHONE ENCOUNTER
----- Message from Yoon Pemberton sent at 2/2/2024  9:41 AM EST -----  Subject: Message to Provider    QUESTIONS  Information for Provider? Lorena, wife of pt is having a flare up with his   Gout in his knee. Wife of pt is requesting the same medication that was   prescribed for this issue during his last flare up. Per wife the   medication helped improve the issue. Pharmacy is [facility] in [place], the   one across the street from your office.   ---------------------------------------------------------------------------  --------------  CALL BACK INFO  [phone]; OK to leave message on voicemail  ---------------------------------------------------------------------------  --------------  SCRIPT ANSWERS  Relationship to Patient? Spouse/Partner  Representative Name? Lorena  Is the representative on the Communication Release of Information (FRANCK)   form in Epic? Yes

## 2024-05-15 NOTE — TELEPHONE ENCOUNTER
Patient wife calling about status refill for  Lisinopril HCTZ 20-12.5. Lorena made aware that request was sent from pharmacy. Lorena reported that patient  only has  pill left.

## 2024-05-16 RX ORDER — LISINOPRIL AND HYDROCHLOROTHIAZIDE 20; 12.5 MG/1; MG/1
TABLET ORAL
Qty: 90 TABLET | Refills: 1 | Status: SHIPPED | OUTPATIENT
Start: 2024-05-16

## 2024-05-16 NOTE — TELEPHONE ENCOUNTER
Last appointment: 12/12/23  Next appointment: 5/30/24  Previous refill encounter(s): 11/15/23 #90 with 1 refill    Requested Prescriptions     Pending Prescriptions Disp Refills    lisinopril-hydroCHLOROthiazide (PRINZIDE;ZESTORETIC) 20-12.5 MG per tablet [Pharmacy Med Name: LISINOPRIL-HCTZ 20-12.5 MG TAB[*]] 90 tablet 3     Sig: TAKE ONE TABLET BY MOUTH ONE TIME DAILY FOR BLOOD PRESSURE         For Pharmacy Admin Tracking Only    Program: Medication Refill  CPA in place:    Recommendation Provided To:   Intervention Detail: New Rx: 1, reason: Patient Preference  Intervention Accepted By:   Gap Closed?:    Time Spent (min): 5

## 2024-05-30 ENCOUNTER — OFFICE VISIT (OUTPATIENT)
Age: 49
End: 2024-05-30
Payer: MEDICARE

## 2024-05-30 VITALS
WEIGHT: 183.8 LBS | DIASTOLIC BLOOD PRESSURE: 86 MMHG | HEIGHT: 66 IN | HEART RATE: 98 BPM | OXYGEN SATURATION: 94 % | SYSTOLIC BLOOD PRESSURE: 124 MMHG | RESPIRATION RATE: 18 BRPM | BODY MASS INDEX: 29.54 KG/M2 | TEMPERATURE: 98.2 F

## 2024-05-30 DIAGNOSIS — C15.3 MALIGNANT NEOPLASM OF UPPER THIRD OF ESOPHAGUS (HCC): ICD-10-CM

## 2024-05-30 DIAGNOSIS — Z98.890 H/O ESOPHAGECTOMY: ICD-10-CM

## 2024-05-30 DIAGNOSIS — M10.9 ACUTE GOUT, UNSPECIFIED CAUSE, UNSPECIFIED SITE: ICD-10-CM

## 2024-05-30 DIAGNOSIS — E78.2 MIXED HYPERLIPIDEMIA: ICD-10-CM

## 2024-05-30 DIAGNOSIS — I10 ESSENTIAL (PRIMARY) HYPERTENSION: ICD-10-CM

## 2024-05-30 DIAGNOSIS — Z12.5 SCREENING FOR MALIGNANT NEOPLASM OF PROSTATE: ICD-10-CM

## 2024-05-30 DIAGNOSIS — Z90.49 H/O ESOPHAGECTOMY: ICD-10-CM

## 2024-05-30 DIAGNOSIS — J30.2 SEASONAL ALLERGIC RHINITIS, UNSPECIFIED TRIGGER: ICD-10-CM

## 2024-05-30 DIAGNOSIS — Z12.11 SCREEN FOR COLON CANCER: ICD-10-CM

## 2024-05-30 DIAGNOSIS — Z00.00 MEDICARE ANNUAL WELLNESS VISIT, SUBSEQUENT: Primary | ICD-10-CM

## 2024-05-30 PROCEDURE — 99213 OFFICE O/P EST LOW 20 MIN: CPT | Performed by: FAMILY MEDICINE

## 2024-05-30 PROCEDURE — 4004F PT TOBACCO SCREEN RCVD TLK: CPT | Performed by: FAMILY MEDICINE

## 2024-05-30 PROCEDURE — G0439 PPPS, SUBSEQ VISIT: HCPCS | Performed by: FAMILY MEDICINE

## 2024-05-30 PROCEDURE — 3079F DIAST BP 80-89 MM HG: CPT | Performed by: FAMILY MEDICINE

## 2024-05-30 PROCEDURE — 3074F SYST BP LT 130 MM HG: CPT | Performed by: FAMILY MEDICINE

## 2024-05-30 PROCEDURE — 3017F COLORECTAL CA SCREEN DOC REV: CPT | Performed by: FAMILY MEDICINE

## 2024-05-30 PROCEDURE — G8417 CALC BMI ABV UP PARAM F/U: HCPCS | Performed by: FAMILY MEDICINE

## 2024-05-30 PROCEDURE — G8427 DOCREV CUR MEDS BY ELIG CLIN: HCPCS | Performed by: FAMILY MEDICINE

## 2024-05-30 RX ORDER — FEXOFENADINE HCL 180 MG/1
180 TABLET ORAL DAILY
Qty: 90 TABLET | Refills: 3 | Status: SHIPPED | OUTPATIENT
Start: 2024-05-30 | End: 2025-05-25

## 2024-05-30 RX ORDER — LISINOPRIL 20 MG/1
20 TABLET ORAL DAILY
Qty: 90 TABLET | Refills: 1 | Status: SHIPPED | OUTPATIENT
Start: 2024-05-30

## 2024-05-30 RX ORDER — PREDNISONE 10 MG/1
10 TABLET ORAL 2 TIMES DAILY
Qty: 14 TABLET | Refills: 1 | Status: SHIPPED | OUTPATIENT
Start: 2024-05-30 | End: 2024-06-19

## 2024-05-30 ASSESSMENT — PATIENT HEALTH QUESTIONNAIRE - PHQ9
SUM OF ALL RESPONSES TO PHQ QUESTIONS 1-9: 0
1. LITTLE INTEREST OR PLEASURE IN DOING THINGS: NOT AT ALL
SUM OF ALL RESPONSES TO PHQ QUESTIONS 1-9: 0
SUM OF ALL RESPONSES TO PHQ9 QUESTIONS 1 & 2: 0
2. FEELING DOWN, DEPRESSED OR HOPELESS: NOT AT ALL
SUM OF ALL RESPONSES TO PHQ QUESTIONS 1-9: 0
SUM OF ALL RESPONSES TO PHQ QUESTIONS 1-9: 0

## 2024-05-30 ASSESSMENT — ENCOUNTER SYMPTOMS
BELCHING: 1
HEARTBURN: 1
ABDOMINAL PAIN: 1
HOARSE VOICE: 1

## 2024-05-30 ASSESSMENT — LIFESTYLE VARIABLES
HAVE YOU OR SOMEONE ELSE BEEN INJURED AS A RESULT OF YOUR DRINKING: NO
HOW OFTEN DURING THE LAST YEAR HAVE YOU HAD A FEELING OF GUILT OR REMORSE AFTER DRINKING: NEVER
HAS A RELATIVE, FRIEND, DOCTOR, OR ANOTHER HEALTH PROFESSIONAL EXPRESSED CONCERN ABOUT YOUR DRINKING OR SUGGESTED YOU CUT DOWN: NO
HOW MANY STANDARD DRINKS CONTAINING ALCOHOL DO YOU HAVE ON A TYPICAL DAY: 7 TO 9
HOW OFTEN DO YOU HAVE A DRINK CONTAINING ALCOHOL: 4 OR MORE TIMES A WEEK
HOW OFTEN DURING THE LAST YEAR HAVE YOU NEEDED AN ALCOHOLIC DRINK FIRST THING IN THE MORNING TO GET YOURSELF GOING AFTER A NIGHT OF HEAVY DRINKING: NEVER
HOW OFTEN DURING THE LAST YEAR HAVE YOU FAILED TO DO WHAT WAS NORMALLY EXPECTED FROM YOU BECAUSE OF DRINKING: NEVER
HOW OFTEN DURING THE LAST YEAR HAVE YOU FOUND THAT YOU WERE NOT ABLE TO STOP DRINKING ONCE YOU HAD STARTED: NEVER
HOW OFTEN DURING THE LAST YEAR HAVE YOU BEEN UNABLE TO REMEMBER WHAT HAPPENED THE NIGHT BEFORE BECAUSE YOU HAD BEEN DRINKING: NEVER

## 2024-05-30 NOTE — PROGRESS NOTES
Chief Complaint   Patient presents with    Medicare AWV     Patient is here today for his medicare annual wellness exam and 6 month follow up.      1. Have you been to the ER, urgent care clinic since your last visit?  Hospitalized since your last visit?No    2. Have you seen or consulted any other health care providers outside of the [facility] since your last visit?  Include any pap smears or colon screening. No

## 2024-05-30 NOTE — PROGRESS NOTES
NAME:  Javon Doe   :   1975   MRN:   [id number]     Date/Time:  2024 4:35 PM  Subjective:f/u GERD ,s/p Esophagectomy,HBP,Gout,AR,Coccydynia.Feeling pretty well   Hypertension  This is a chronic problem. The problem is controlled. Pertinent negatives include no anxiety, malaise/fatigue or peripheral edema.   Gastroesophageal Reflux  He complains of abdominal pain, belching, heartburn and a hoarse voice. This is a chronic problem. The problem has been unchanged. Associated symptoms include fatigue.     Back Pain  This is a chronic problem. The current episode started more than 1 year ago. The problem occurs daily. The problem is unchanged. Associated symptoms include chest pain.   Gastroesophageal Reflux  He complains of belching, chest pain, choking and dysphagia. This is a recurrent problem. The problem has been unchanged. The symptoms are aggravated by certain foods. Pertinent negatives include no fatigue     Review of Systems   Constitutional:  Positive for fatigue. Negative for malaise/fatigue.   HENT:  Positive for hoarse voice.    Gastrointestinal:  Positive for abdominal pain and heartburn.           Medications reviewed:  Current Outpatient Medications   Medication Sig   • fexofenadine (ALLEGRA) 180 MG tablet Take 1 tablet by mouth daily   • lisinopril (PRINIVIL;ZESTRIL) 20 MG tablet Take 1 tablet by mouth daily   • predniSONE (DELTASONE) 10 MG tablet Take 1 tablet by mouth 2 times daily for 20 days   • cetirizine (ZYRTEC) 10 MG tablet TAKE ONE TABLET BY MOUTH ONE TIME DAILY   • albuterol sulfate HFA (PROVENTIL;VENTOLIN;PROAIR) 108 (90 Base) MCG/ACT inhaler Inhale 2 puffs into the lungs every 4 hours as needed for Wheezing   • NEXIUM 40 MG delayed release capsule      No current facility-administered medications for this visit.        Objective:   Vitals:  /86 (Site: Right Upper Arm, Position: Supine, Cuff Size: Large Adult)   Pulse 98   Temp 98.2 °F (36.8 °C) (Oral)   Resp 18

## 2024-05-31 LAB
ALBUMIN SERPL-MCNC: 3.8 G/DL (ref 3.5–5)
ALBUMIN/GLOB SERPL: 1.2 (ref 1.1–2.2)
ALP SERPL-CCNC: 104 U/L (ref 45–117)
ALT SERPL-CCNC: 22 U/L (ref 12–78)
ANION GAP SERPL CALC-SCNC: 6 MMOL/L (ref 5–15)
AST SERPL-CCNC: 18 U/L (ref 15–37)
BASOPHILS # BLD: 0 K/UL (ref 0–0.1)
BASOPHILS NFR BLD: 1 % (ref 0–1)
BILIRUB SERPL-MCNC: 0.3 MG/DL (ref 0.2–1)
BUN SERPL-MCNC: 14 MG/DL (ref 6–20)
BUN/CREAT SERPL: 16 (ref 12–20)
CALCIUM SERPL-MCNC: 8.7 MG/DL (ref 8.5–10.1)
CHLORIDE SERPL-SCNC: 108 MMOL/L (ref 97–108)
CHOLEST SERPL-MCNC: 160 MG/DL
CO2 SERPL-SCNC: 23 MMOL/L (ref 21–32)
COMMENT:: NORMAL
CREAT SERPL-MCNC: 0.88 MG/DL (ref 0.7–1.3)
DIFFERENTIAL METHOD BLD: ABNORMAL
EOSINOPHIL # BLD: 0.5 K/UL (ref 0–0.4)
EOSINOPHIL NFR BLD: 9 % (ref 0–7)
ERYTHROCYTE [DISTWIDTH] IN BLOOD BY AUTOMATED COUNT: 13.3 % (ref 11.5–14.5)
GLOBULIN SER CALC-MCNC: 3.2 G/DL (ref 2–4)
GLUCOSE SERPL-MCNC: 190 MG/DL (ref 65–100)
HCT VFR BLD AUTO: 38.2 % (ref 36.6–50.3)
HDLC SERPL-MCNC: 31 MG/DL
HDLC SERPL: 5.2 (ref 0–5)
HGB BLD-MCNC: 13.1 G/DL (ref 12.1–17)
IMM GRANULOCYTES # BLD AUTO: 0 K/UL (ref 0–0.04)
IMM GRANULOCYTES NFR BLD AUTO: 1 % (ref 0–0.5)
LDLC SERPL CALC-MCNC: 82 MG/DL (ref 0–100)
LYMPHOCYTES # BLD: 1.1 K/UL (ref 0.8–3.5)
LYMPHOCYTES NFR BLD: 19 % (ref 12–49)
MCH RBC QN AUTO: 30 PG (ref 26–34)
MCHC RBC AUTO-ENTMCNC: 34.3 G/DL (ref 30–36.5)
MCV RBC AUTO: 87.4 FL (ref 80–99)
MONOCYTES # BLD: 0.4 K/UL (ref 0–1)
MONOCYTES NFR BLD: 7 % (ref 5–13)
NEUTS SEG # BLD: 3.9 K/UL (ref 1.8–8)
NEUTS SEG NFR BLD: 63 % (ref 32–75)
NRBC # BLD: 0 K/UL (ref 0–0.01)
NRBC BLD-RTO: 0 PER 100 WBC
PLATELET # BLD AUTO: 193 K/UL (ref 150–400)
PMV BLD AUTO: 10.8 FL (ref 8.9–12.9)
POTASSIUM SERPL-SCNC: 3.9 MMOL/L (ref 3.5–5.1)
PROT SERPL-MCNC: 7 G/DL (ref 6.4–8.2)
PSA SERPL-MCNC: 0.8 NG/ML (ref 0.01–4)
RBC # BLD AUTO: 4.37 M/UL (ref 4.1–5.7)
SODIUM SERPL-SCNC: 137 MMOL/L (ref 136–145)
SPECIMEN HOLD: NORMAL
TRIGL SERPL-MCNC: 235 MG/DL
URATE SERPL-MCNC: 6.2 MG/DL (ref 3.5–7.2)
VLDLC SERPL CALC-MCNC: 47 MG/DL
WBC # BLD AUTO: 6.1 K/UL (ref 4.1–11.1)

## 2024-11-04 DIAGNOSIS — I10 ESSENTIAL (PRIMARY) HYPERTENSION: ICD-10-CM

## 2024-11-05 RX ORDER — LISINOPRIL 20 MG/1
20 TABLET ORAL DAILY
Qty: 90 TABLET | Refills: 2 | Status: SHIPPED | OUTPATIENT
Start: 2024-11-05

## 2024-11-30 NOTE — PROGRESS NOTES
NAME:  Javon Doe   :   1975   MRN:   [id number]     Date/Time:  2024 6:10 AM  Subjective:new rt knee pain and swelling ,no apparent injury,f/u hbp,gerd,lumbago   Knee Pain   The incident occurred 3 to 5 days ago. There was no injury mechanism. The pain is present in the right knee. The pain is at a severity of 6/10. The pain is moderate. The pain has been Constant since onset. Associated symptoms include an inability to bear weight.      Hypertension  This is a chronic problem. The problem is controlled. Pertinent negatives include no anxiety, malaise/fatigue or peripheral edema.   Gastroesophageal Reflux  He complains of abdominal pain, belching, heartburn and a hoarse voice. This is a chronic problem. The problem has been unchanged. Associated symptoms include fatigue.     Back Pain  This is a chronic problem. The current episode started more than 1 year ago. The problem occurs daily. The problem is unchanged. Associated symptoms include chest pain.   Gastroesophageal Reflux  He complains of belching, chest pain, choking and dysphagia. This is a recurrent problem. The problem has been unchanged. The symptoms are aggravated by certain foods. Pertinent negatives include no fatigue  Review of Systems   Constitutional:  Negative for activity change.   HENT:  Negative for congestion.    Respiratory:  Negative for chest tightness and wheezing.    Cardiovascular:  Negative for chest pain.   Gastrointestinal:  Negative for abdominal distention, anal bleeding, blood in stool and constipation.   Musculoskeletal:  Positive for arthralgias and back pain.   Neurological:  Negative for dizziness.             Medications reviewed:  Current Outpatient Medications   Medication Sig    lisinopril (PRINIVIL;ZESTRIL) 20 MG tablet TAKE ONE TABLET BY MOUTH ONE TIME DAILY    fexofenadine (ALLEGRA) 180 MG tablet Take 1 tablet by mouth daily    cetirizine (ZYRTEC) 10 MG tablet TAKE ONE TABLET BY MOUTH ONE TIME

## 2024-12-02 ENCOUNTER — OFFICE VISIT (OUTPATIENT)
Age: 49
End: 2024-12-02
Payer: MEDICARE

## 2024-12-02 VITALS
BODY MASS INDEX: 29.38 KG/M2 | OXYGEN SATURATION: 96 % | HEIGHT: 66 IN | RESPIRATION RATE: 16 BRPM | TEMPERATURE: 96.3 F | WEIGHT: 182.8 LBS | DIASTOLIC BLOOD PRESSURE: 75 MMHG | SYSTOLIC BLOOD PRESSURE: 111 MMHG | HEART RATE: 72 BPM

## 2024-12-02 DIAGNOSIS — M53.3 SACROCOCCYGEAL DISORDERS, NOT ELSEWHERE CLASSIFIED: ICD-10-CM

## 2024-12-02 DIAGNOSIS — Z12.11 SCREEN FOR COLON CANCER: ICD-10-CM

## 2024-12-02 DIAGNOSIS — I10 ESSENTIAL (PRIMARY) HYPERTENSION: Primary | ICD-10-CM

## 2024-12-02 DIAGNOSIS — M54.42 CHRONIC LOW BACK PAIN WITH LEFT-SIDED SCIATICA, UNSPECIFIED BACK PAIN LATERALITY: ICD-10-CM

## 2024-12-02 DIAGNOSIS — Z91.038 HYMENOPTERA ALLERGY: ICD-10-CM

## 2024-12-02 DIAGNOSIS — Z98.890 H/O ESOPHAGECTOMY: ICD-10-CM

## 2024-12-02 DIAGNOSIS — M10.9 ACUTE GOUT OF RIGHT KNEE, UNSPECIFIED CAUSE: ICD-10-CM

## 2024-12-02 DIAGNOSIS — G89.29 CHRONIC LOW BACK PAIN WITH LEFT-SIDED SCIATICA, UNSPECIFIED BACK PAIN LATERALITY: ICD-10-CM

## 2024-12-02 DIAGNOSIS — Z90.49 H/O ESOPHAGECTOMY: ICD-10-CM

## 2024-12-02 DIAGNOSIS — E78.2 MIXED HYPERLIPIDEMIA: ICD-10-CM

## 2024-12-02 PROCEDURE — G8417 CALC BMI ABV UP PARAM F/U: HCPCS | Performed by: FAMILY MEDICINE

## 2024-12-02 PROCEDURE — 3017F COLORECTAL CA SCREEN DOC REV: CPT | Performed by: FAMILY MEDICINE

## 2024-12-02 PROCEDURE — 99214 OFFICE O/P EST MOD 30 MIN: CPT | Performed by: FAMILY MEDICINE

## 2024-12-02 PROCEDURE — 3074F SYST BP LT 130 MM HG: CPT | Performed by: FAMILY MEDICINE

## 2024-12-02 PROCEDURE — G8484 FLU IMMUNIZE NO ADMIN: HCPCS | Performed by: FAMILY MEDICINE

## 2024-12-02 PROCEDURE — G8427 DOCREV CUR MEDS BY ELIG CLIN: HCPCS | Performed by: FAMILY MEDICINE

## 2024-12-02 PROCEDURE — 4004F PT TOBACCO SCREEN RCVD TLK: CPT | Performed by: FAMILY MEDICINE

## 2024-12-02 PROCEDURE — 3078F DIAST BP <80 MM HG: CPT | Performed by: FAMILY MEDICINE

## 2024-12-02 RX ORDER — PREDNISONE 20 MG/1
20 TABLET ORAL 2 TIMES DAILY
Qty: 10 TABLET | Refills: 1 | Status: SHIPPED | OUTPATIENT
Start: 2024-12-02 | End: 2024-12-07

## 2024-12-02 RX ORDER — EPINEPHRINE 0.3 MG/.3ML
0.3 INJECTION SUBCUTANEOUS ONCE
Qty: 0.3 ML | Refills: 0 | Status: SHIPPED | OUTPATIENT
Start: 2024-12-02 | End: 2024-12-02

## 2024-12-02 SDOH — ECONOMIC STABILITY: INCOME INSECURITY: HOW HARD IS IT FOR YOU TO PAY FOR THE VERY BASICS LIKE FOOD, HOUSING, MEDICAL CARE, AND HEATING?: NOT HARD AT ALL

## 2024-12-02 SDOH — ECONOMIC STABILITY: FOOD INSECURITY: WITHIN THE PAST 12 MONTHS, YOU WORRIED THAT YOUR FOOD WOULD RUN OUT BEFORE YOU GOT MONEY TO BUY MORE.: NEVER TRUE

## 2024-12-02 SDOH — ECONOMIC STABILITY: FOOD INSECURITY: WITHIN THE PAST 12 MONTHS, THE FOOD YOU BOUGHT JUST DIDN'T LAST AND YOU DIDN'T HAVE MONEY TO GET MORE.: NEVER TRUE

## 2024-12-02 ASSESSMENT — ENCOUNTER SYMPTOMS
BACK PAIN: 1
CONSTIPATION: 0
ANAL BLEEDING: 0
CHEST TIGHTNESS: 0
BLOOD IN STOOL: 0
WHEEZING: 0
ABDOMINAL DISTENTION: 0

## 2024-12-02 NOTE — PROGRESS NOTES
Chief Complaint   Patient presents with    Follow-up    Gout       \"Have you been to the ER, urgent care clinic since your last visit?  Hospitalized since your last visit?\"    NO    “Have you seen or consulted any other health care providers outside of [facility] since your last visit?”    Yes Ortho        “Have you had a colorectal cancer screening such as a colonoscopy/FIT/Cologuard?    YES - Type: Colonoscopy - Where: [facility] Nurse/CMA to request most recent records if not in the chart     No colonoscopy on file  No cologuard on file  No FIT/FOBT on file   No flexible sigmoidoscopy on file         Click Here for Release of Records Request         Health Maintenance Due   Topic Date Due    HIV screen  Never done    Hepatitis C screen  Never done    Hepatitis B vaccine (1 of 3 - 19+ 3-dose series) Never done    Diabetes screen  Never done    Colorectal Cancer Screen  Never done    Shingles vaccine (1 of 2) Never done    Lung Cancer Screening &/or Counseling  Never done    Flu vaccine (1) Never done    COVID-19 Vaccine ( season) 2024        The patient, Javon Doe, identity was verified by name and .

## 2024-12-03 LAB
ALBUMIN SERPL-MCNC: 4.2 G/DL (ref 3.5–5)
ALBUMIN/GLOB SERPL: 1.5 (ref 1.1–2.2)
ALP SERPL-CCNC: 98 U/L (ref 45–117)
ALT SERPL-CCNC: 23 U/L (ref 12–78)
ANION GAP SERPL CALC-SCNC: 6 MMOL/L (ref 2–12)
AST SERPL-CCNC: 18 U/L (ref 15–37)
BASOPHILS # BLD: 0 K/UL (ref 0–0.1)
BASOPHILS NFR BLD: 1 % (ref 0–1)
BILIRUB SERPL-MCNC: 0.4 MG/DL (ref 0.2–1)
BUN SERPL-MCNC: 14 MG/DL (ref 6–20)
BUN/CREAT SERPL: 18 (ref 12–20)
CALCIUM SERPL-MCNC: 8.6 MG/DL (ref 8.5–10.1)
CHLORIDE SERPL-SCNC: 107 MMOL/L (ref 97–108)
CHOLEST SERPL-MCNC: 153 MG/DL
CO2 SERPL-SCNC: 29 MMOL/L (ref 21–32)
CREAT SERPL-MCNC: 0.76 MG/DL (ref 0.7–1.3)
DIFFERENTIAL METHOD BLD: NORMAL
EOSINOPHIL # BLD: 0.4 K/UL (ref 0–0.4)
EOSINOPHIL NFR BLD: 6 % (ref 0–7)
ERYTHROCYTE [DISTWIDTH] IN BLOOD BY AUTOMATED COUNT: 13.2 % (ref 11.5–14.5)
GLOBULIN SER CALC-MCNC: 2.8 G/DL (ref 2–4)
GLUCOSE SERPL-MCNC: 96 MG/DL (ref 65–100)
HCT VFR BLD AUTO: 38.8 % (ref 36.6–50.3)
HGB BLD-MCNC: 12.6 G/DL (ref 12.1–17)
IMM GRANULOCYTES # BLD AUTO: 0 K/UL (ref 0–0.04)
IMM GRANULOCYTES NFR BLD AUTO: 0 % (ref 0–0.5)
LYMPHOCYTES # BLD: 1.5 K/UL (ref 0.8–3.5)
LYMPHOCYTES NFR BLD: 24 % (ref 12–49)
MCH RBC QN AUTO: 28.3 PG (ref 26–34)
MCHC RBC AUTO-ENTMCNC: 32.5 G/DL (ref 30–36.5)
MCV RBC AUTO: 87 FL (ref 80–99)
MONOCYTES # BLD: 0.5 K/UL (ref 0–1)
MONOCYTES NFR BLD: 9 % (ref 5–13)
NEUTS SEG # BLD: 3.7 K/UL (ref 1.8–8)
NEUTS SEG NFR BLD: 60 % (ref 32–75)
NRBC # BLD: 0 K/UL (ref 0–0.01)
NRBC BLD-RTO: 0 PER 100 WBC
PLATELET # BLD AUTO: 175 K/UL (ref 150–400)
PMV BLD AUTO: 11.7 FL (ref 8.9–12.9)
POTASSIUM SERPL-SCNC: 4.4 MMOL/L (ref 3.5–5.1)
PROT SERPL-MCNC: 7 G/DL (ref 6.4–8.2)
RBC # BLD AUTO: 4.46 M/UL (ref 4.1–5.7)
SODIUM SERPL-SCNC: 142 MMOL/L (ref 136–145)
WBC # BLD AUTO: 6.1 K/UL (ref 4.1–11.1)

## 2025-08-21 DIAGNOSIS — I10 ESSENTIAL (PRIMARY) HYPERTENSION: ICD-10-CM

## 2025-08-22 RX ORDER — LISINOPRIL 20 MG/1
20 TABLET ORAL DAILY
Qty: 90 TABLET | Refills: 0 | Status: SHIPPED | OUTPATIENT
Start: 2025-08-22